# Patient Record
(demographics unavailable — no encounter records)

---

## 2025-01-14 NOTE — HEALTH RISK ASSESSMENT
[Very Good] : ~his/her~ current health as very good [Good] : ~his/her~  mood as  good [Yes] : Yes [No] : In the past 12 months have you used drugs other than those required for medical reasons? No [One fall no injury in past year] : Patient reported one fall in the past year without injury [Assistive Device] : Patient uses an assistive device [Little interest or pleasure doing things] : 1) Little interest or pleasure doing things [Feeling down, depressed, or hopeless] : 2) Feeling down, depressed, or hopeless [0] : 2) Feeling down, depressed, or hopeless: Not at all (0) [PHQ-2 Negative - No further assessment needed] : PHQ-2 Negative - No further assessment needed [Never] : Never [NO] : No [Patient reported mammogram was normal] : Patient reported mammogram was normal [Patient reported bone density results were normal] : Patient reported bone density results were normal [Patient reported colonoscopy was abnormal] : Patient reported colonoscopy was abnormal [With Family] : lives with family [# of Members in Household ___] :  household currently consist of [unfilled] member(s) [Retired] : retired [Graduate School] : graduate school [] :  [# Of Children ___] : has [unfilled] children [Feels Safe at Home] : Feels safe at home [Fully functional (bathing, dressing, toileting, transferring, walking, feeding)] : Fully functional (bathing, dressing, toileting, transferring, walking, feeding) [Fully functional (using the telephone, shopping, preparing meals, housekeeping, doing laundry, using] : Fully functional and needs no help or supervision to perform IADLs (using the telephone, shopping, preparing meals, housekeeping, doing laundry, using transportation, managing medications and managing finances) [Reports changes in dental health] : Reports changes in dental health [Smoke Detector] : smoke detector [Carbon Monoxide Detector] : carbon monoxide detector [Seat Belt] :  uses seat belt [Sunscreen] : uses sunscreen [Designated Healthcare Proxy] : Designated healthcare proxy [Name: ___] : Health Care Proxy's Name: [unfilled]  [Relationship: ___] : Relationship: [unfilled] [Monthly or less (1 pt)] : Monthly or less (1 point) [1 or 2 (0 pts)] : 1 or 2 (0 points) [Never (0 pts)] : Never (0 points) [None] : None [de-identified] : Rarely, wine. [de-identified] : Maintains active by walking, working out at home, going to the gym, and swimming.   [de-identified] : Maintains a healthy diet.  [PYX4Alubj] : 0 [Reports changes in hearing] : Reports no changes in hearing [Reports changes in vision] : Reports no changes in vision [Travel to Developing Areas] : does not  travel to developing areas [TB Exposure] : is not being exposed to tuberculosis [Caregiver Concerns] : does not have caregiver concerns [MammogramDate] : 11/2024 [PapSmearDate] : n/a [BoneDensityDate] : 2024 [ColonoscopyDate] : 06/2022 [ColonoscopyComments] : Internal hemorrhoids, diverticula. [de-identified] : Lives with her .  [FreeTextEntry3] : Has a daughter. [de-identified] : Follows with an ENT. [de-identified] : Follows with ophthalmologist. Wears contacts, nearsighted.  [de-identified] : New cap placed. Follows with a dentist.

## 2025-01-14 NOTE — ASSESSMENT
[FreeTextEntry1] : Annual wellness   UTD with mammogram, colonoscopy, and bone density scan.  Hx of bladder prolapse, fecal incontinence, s/p SNM Stage 1 (7/25/2024) and Stage II (8/1/2024) cont estradiol cream cont with Metamucil and Align.  following with GYN URO  HTN cont Amlodipine 5 mg daily  Reinforced the importance of following a low sodium diet follow up with cardiology  Hx of elevated cholesterol Reinforced the importance of following a low fat/low cholesterol diet We'll check LFTs and lipids today  Hypothyroid cont Levothyroxine 75 mcg daily We'll check TSH/T4 today  Blood work ordered. Follow up in one week for lab results

## 2025-01-14 NOTE — ADDENDUM
[FreeTextEntry1] : Documented by Nirmala Ruiz acting as a scribe for Dr. Elsy Roberts. 01/14/2025   All medical record entries made by the scribe were at my, Dr. Elsy Roberts, direction and personally dictated by me on 01/14/2025. I have reviewed the chart and agree that the record accurately reflects my personal performance of the history, physical exam, assessment and plan. I have also personally directed, reviewed, and agreed with the chart.

## 2025-01-14 NOTE — HISTORY OF PRESENT ILLNESS
[de-identified] : Ms. CECILIA QIU is a 80 year old female accompanied by her  with Hx of HTN, hypothyroid, elevated cholesterol, s/p Lt knee replacement March 16, 2023 , who presents an annual wellness visit.    Pt states she is feeling well. Offers no complaints. Denies any SOB, CP, abdominal pain, N/V/D, headache, dizziness, or leg swelling.  Reports compliance with taking her meds daily.

## 2025-02-13 NOTE — CONSULT LETTER
[Please see my note below.] : Please see my note below. [FreeTextEntry1] : Dear Dr. ANKUSH FERNANDEZ \par  I had the pleasure of evaluating your patient CECILIA QIU, thank you for allowing us to participate in their care. please see full note detailing our visit below.\par  If you have any questions, please do not hesitate to call me and I would be happy to discuss further. \par  \par  Justino Ji M.D.\par  Attending Physician,  \par  Department of Otolaryngology - Head and Neck Surgery\par  [facility] \par  Office: [phone]\par  Fax: [phone]\par  \par

## 2025-02-13 NOTE — PROCEDURE
[de-identified] : Procedure performed: Nasal Endoscopy- Diagnostic Pre-op indication(s): nasal congestion Post-op indication(s): nasal congestion  Verbal and/or written consent obtained from patient Anterior rhinoscopy insufficient to account for symptoms Scope #: 3,  flexible fiber optic telescope  The scope was introduced in the nasal passage between the middle and inferior turbinates to exam the inferior portion of the middle meatus and the fontanelle, as well as the maxillary ostia.  Next, the scope was passed medically and posteriorly to the middle turbinates to examine the sphenoethmoid recess and the superior turbinate region. Upon visualization the finders are as follows: Nasal Septum: Right septal deviation, drying and crusting anterior on the right  Bilateral - Mucosa: boggy turbinates, Mucous: scant, Polyp: not seen, Inferior Turbinate: boggy, Middle Turbinate: normal, Superior Turbinate: normal, Inferior Meatus: narrow, Middle Meatus: narrow, Super Meatus:normal, Sphenoethmoidal Recess: clear

## 2025-02-13 NOTE — HISTORY OF PRESENT ILLNESS
Rx routed/e-scribed to patient's preferred pharmacy. [de-identified] : 76 y/o female with decreased hearing b/l, had audio done last visit. States she has to ask everyone to repeat herself. \par  feels hearing maybe declining- mild to moderate HL last visit \par  No pain, drainage, tinnitus AU. No vertigo. \par  \par  Also with right sided nasal congestion for the past few days. Allergy testing in the past negative for environmental allergies but notes an increase in nasal congestion around May and fall. \par  takes flonase as needed, but not taking recently \par  No runny nose or sinus pressure. [FreeTextEntry1] : still with runny nose, uses atrovent PRN, worse with change of weather. occ congestion no sinus sx  feels hearing is stable no Vertigo, tinnitus, pain, drainage or facial weakness.

## 2025-02-13 NOTE — ASSESSMENT
[FreeTextEntry1] : 77 y/o F with nasal congestion. On exam, with NSD, BITH  left septal deviation, clear nasopharynx.  dryness right side ponaris oil discussed use of Atrovent   Pt also with fluid in L ear has resolved audio stable clear exam

## 2025-02-13 NOTE — PHYSICAL EXAM
[Nasal Endoscopy Performed] : nasal endoscopy was performed, see procedure section for findings [Midline] : trachea located in midline position [Normal] : salivary glands are normal

## 2025-04-11 NOTE — HISTORY OF PRESENT ILLNESS
[de-identified] : CECILIA QIU 81 year old female presents for follow-up evaluation of 2 yrs left TKR doing very well. Her pain is well controlled and is able to do all activities such as aquatic exercise. Overall, she has no pain.

## 2025-04-11 NOTE — ADDENDUM
[FreeTextEntry1] : This note was written by Pepe Kumar on 04/11/2025 acting as scribe for Dr. Naldo Moss M.D.   I, Dr. Naldo Moss, have read and attest that all the information, medical decision making and discharge instructions within are true and accurate.

## 2025-04-11 NOTE — PHYSICAL EXAM
[de-identified] : General appearance: well nourished and hydrated, pleasant, alert and oriented x 3, cooperative. HEENT: Normocephalic, EOM intact, Nasal septum midline, Oral cavity clear, External auditory canal clear. Cardiovascular: no apparent abnormalities, no lower leg edema, no varicosities, pedal pulses are palpable. Lymphatics Lymph nodes: none palpated, Lymphedema: not present. Neurologic: sensation is normal, no muscle weakness in upper or lower extremities, patella tendon reflexes intact . Dermatologic no apparent skin lesions, moist, warm, no rash. Spine:cervical spine appears normal and moves freely, thoracic spine appears normal and moves freely, lumbosacral spine appears normal and moves freely. Gait: nonantalgic.  Left Knee Inspection: no effusion Wounds: healed midline incision Alignment: normal. Palpation: no specific tenderness on palpation. ROM: Active (in degrees): 0-130 with no instability Ligamentous laxity (neg): negative ant. drawer test, negative post. drawer test, stable to varus stress test, stable to valgus stress test, Patellofemoral Alignment Test: Q angle-, normal. Muscle Test: good quad strength. Leg examination: calf is soft and non-tender. [de-identified] : Left knee x-ray, AP, lateral, merchant view taken at the office today demonstrates a total knee replacement in satisfactory position and alignment. No evidence of loosening. The patella sits in a central position.  Right knee x-ray merchant view taken at the office today demonstrates joint space narrowing and a well centered patella.

## 2025-04-11 NOTE — DISCUSSION/SUMMARY
[de-identified] : Patient is doing well following their s/p Left TKR. I reviewed x-rays with them and compared to prior films with no change from priors. I have reassured them that their implants are functioning well. All questions answered, understanding verbalized. KOOS Jr and PROMIS scores were obtained and reviewed.   She is encouraged to continue to stay active with a home exercise program and activities as tolerated.   I will see them back in 1 year.